# Patient Record
Sex: FEMALE | Race: OTHER | HISPANIC OR LATINO
[De-identification: names, ages, dates, MRNs, and addresses within clinical notes are randomized per-mention and may not be internally consistent; named-entity substitution may affect disease eponyms.]

---

## 2024-05-29 PROBLEM — Z00.00 ENCOUNTER FOR PREVENTIVE HEALTH EXAMINATION: Status: ACTIVE | Noted: 2024-05-29

## 2024-06-02 ENCOUNTER — NON-APPOINTMENT (OUTPATIENT)
Age: 79
End: 2024-06-02

## 2024-06-03 ENCOUNTER — APPOINTMENT (OUTPATIENT)
Dept: ENDOCRINOLOGY | Facility: CLINIC | Age: 79
End: 2024-06-03
Payer: MEDICARE

## 2024-06-03 VITALS
DIASTOLIC BLOOD PRESSURE: 66 MMHG | WEIGHT: 114 LBS | BODY MASS INDEX: 26.38 KG/M2 | OXYGEN SATURATION: 96 % | HEIGHT: 55 IN | SYSTOLIC BLOOD PRESSURE: 120 MMHG | HEART RATE: 69 BPM

## 2024-06-03 PROCEDURE — 99204 OFFICE O/P NEW MOD 45 MIN: CPT

## 2024-06-03 PROCEDURE — G2211 COMPLEX E/M VISIT ADD ON: CPT

## 2024-06-03 RX ORDER — ALENDRONATE SODIUM 70 MG/1
70 TABLET ORAL
Qty: 2 | Refills: 3 | Status: ACTIVE | COMMUNITY
Start: 2024-06-03 | End: 1900-01-01

## 2024-06-03 RX ORDER — METOPROLOL TARTRATE 75 MG/1
TABLET, FILM COATED ORAL
Refills: 0 | Status: ACTIVE | COMMUNITY

## 2024-06-03 RX ORDER — DAPAGLIFLOZIN 10 MG/1
10 TABLET, FILM COATED ORAL
Refills: 0 | Status: ACTIVE | COMMUNITY

## 2024-06-03 RX ORDER — LISINOPRIL 10 MG/1
10 TABLET ORAL
Refills: 0 | Status: ACTIVE | COMMUNITY

## 2024-06-03 RX ORDER — ASPIRIN 81 MG
81 TABLET, DELAYED RELEASE (ENTERIC COATED) ORAL
Refills: 0 | Status: ACTIVE | COMMUNITY

## 2024-06-03 RX ORDER — ROSUVASTATIN CALCIUM 40 MG/1
40 TABLET, FILM COATED ORAL
Refills: 0 | Status: ACTIVE | COMMUNITY

## 2024-06-03 NOTE — HISTORY OF PRESENT ILLNESS
[FreeTextEntry1] : Osteoporosis diagnosed by DEXA scan Previous osteoporosis treatments: Alendronate x 2 years Last DEXA scan: 6 years ago History of fractures: Denies Height loss: Possibly Calcium and Vitamin D through diet and supplementation: Calcium 400 mg daily Physically active: Walks Problems with balance or vision: Denies History of falls: Denies History of hyperparathyroidism: Denies History of hypercalcemia: Denies History of kidney stones: Denies History of malabsorption or eating disorder: Prior long-duration of glucocorticoid use: Denies Prior anti-epileptic medications: Denies Prior chemotherapy or radiation therapy: Denies Caffeine, smoking, and alcohol history: Denies Prolonged amenorrhea or early menopause: Denies Family history of fractures, osteoporosis, or hyperparathyroidism: Sister, osteoporosis.  Heartburn or reflux symptoms: Denies ASCVD History: "Mini heart attacks" 7 years ago Dentist following: Top and bottom dentures.  6/3/2024- INITIAL VISIT Patient and her daughter report that she was diagnosed with osteoporosis per DEXA scan a few years back and started on weekly alendronate approximately 2 years ago.  The patient reportedly went back to her home country recently and when she returned, she had run out of her medication.  Both are now interested in annual Reclast infusions to simplify therapy. I recommend obtaining a current DEXA scan to ensure bisphosphonate therapy is sufficient to treat her degree of osteoporosis.  Additionally I would like x-rays of the thoracolumbar spine to assess for vertebral compression fractures which would necessitate treatment with anabolic therapy.  Routine osteoporosis labs are needed to evaluate calcium, kidney, and vitamin D levels, as well as to evaluate for secondary causes of osteoporosis, such as primary hyperparathyroidism. Discussed supportive management by ingesting 8972-5134 mg elemental calcium and 2000 units vitamin D daily through diet and supplementation. Encouraged weight-bearing exercises and avoidance of prolonged bedrest. Discussed possible adverse effects of osteoporosis therapy, including hypocalcemia, injection site reaction, musculoskeletal aches, osteonecrosis of the jaw and atypical femoral fractures. These complications do not occur often, rarely if at all. We will need routine labs drawn regularly just to ensure there is no hypocalcemia or vitamin D deficiency.  Results of tests will be followed up with plan over the phone.  She will continue weekly Fosamax unless severe osteoporosis is detected. Patient will follow-up in clinic every 6 months with routine osteoporosis labs completed prior to the visits. All questions and concerns were fully addressed to patient's satisfaction. Patient is in agreement with stated plan.

## 2024-06-03 NOTE — REVIEW OF SYSTEMS
[Fatigue] : no fatigue [Decreased Appetite] : appetite not decreased [Recent Weight Loss (___ Lbs)] : no recent weight loss [Visual Field Defect] : no visual field defect [Dysphagia] : no dysphagia [Neck Pain] : no neck pain [Dysphonia] : no dysphonia [Polyuria] : no polyuria [Polydipsia] : no polydipsia [Negative] : Gastrointestinal [FreeTextEntry9] : No hip/groin pain.

## 2024-06-03 NOTE — REASON FOR VISIT
[Initial Evaluation] : an initial evaluation [Osteoporosis] : osteoporosis [Family Member] : family member [Ad Hoc ] : provided by an ad hoc  [Source: ______] : History obtained from SUE [Interpreters_FullName] : Maria Esther [Interpreters_Relationshiptopatient] : Daughter [TWNoteComboBox1] : [place]

## 2024-06-03 NOTE — PHYSICAL EXAM
[Alert] : alert [Healthy Appearance] : healthy appearance [No Acute Distress] : no acute distress [Well Developed] : well developed [Normal Voice/Communication] : normal voice communication [Normal Sclera/Conjunctiva] : normal sclera/conjunctiva [EOMI] : extra ocular movement intact [No Proptosis] : no proptosis [Normal Hearing] : hearing was normal [No Neck Mass] : no neck mass was observed [No Respiratory Distress] : no respiratory distress [Normal Rate and Effort] : normal respiratory rate and effort [Normal Rate] : heart rate was normal [Regular Rhythm] : with a regular rhythm [No Spinal Tenderness] : no spinal tenderness [Spine Straight] : spine straight [Kyphosis] : no kyphosis present [No Stigmata of Cushings Syndrome] : no stigmata of Cushings Syndrome [Normal Gait] : normal gait [Normal Strength/Tone] : muscle strength and tone were normal [No Rash] : no rash [No Motor Deficits] : the motor exam was normal [No Tremors] : no tremors [Oriented x3] : oriented to person, place, and time [Normal Affect] : the affect was normal [Recent Memory Normal] : recent memory was not impaired [Normal Insight/Judgement] : insight and judgment were intact [Normal Mood] : the mood was normal [Remote Memory Normal] : remote memory was not impaired

## 2024-06-06 LAB
24R-OH-CALCIDIOL SERPL-MCNC: 51.4 PG/ML
25(OH)D3 SERPL-MCNC: 26.8 NG/ML
ALBUMIN SERPL ELPH-MCNC: 4.5 G/DL
ALP BLD-CCNC: 93 U/L
ALT SERPL-CCNC: 24 U/L
ANION GAP SERPL CALC-SCNC: 13 MMOL/L
AST SERPL-CCNC: 20 U/L
BASOPHILS # BLD AUTO: 0.01 K/UL
BASOPHILS NFR BLD AUTO: 0.2 %
BILIRUB SERPL-MCNC: 1 MG/DL
BUN SERPL-MCNC: 20 MG/DL
CALCIUM SERPL-MCNC: 9.2 MG/DL
CALCIUM SERPL-MCNC: 9.2 MG/DL
CHLORIDE SERPL-SCNC: 107 MMOL/L
CO2 SERPL-SCNC: 22 MMOL/L
CREAT SERPL-MCNC: 0.87 MG/DL
EGFR: 68 ML/MIN/1.73M2
EOSINOPHIL # BLD AUTO: 0.03 K/UL
EOSINOPHIL NFR BLD AUTO: 0.6 %
GLUCOSE SERPL-MCNC: 109 MG/DL
HCT VFR BLD CALC: 36.3 %
HGB BLD-MCNC: 11.7 G/DL
IMM GRANULOCYTES NFR BLD AUTO: 0.2 %
LYMPHOCYTES # BLD AUTO: 2.49 K/UL
LYMPHOCYTES NFR BLD AUTO: 45.9 %
MAGNESIUM SERPL-MCNC: 2 MG/DL
MAN DIFF?: NORMAL
MCHC RBC-ENTMCNC: 30.2 PG
MCHC RBC-ENTMCNC: 32.2 GM/DL
MCV RBC AUTO: 93.6 FL
MONOCYTES # BLD AUTO: 0.33 K/UL
MONOCYTES NFR BLD AUTO: 6.1 %
NEUTROPHILS # BLD AUTO: 2.56 K/UL
NEUTROPHILS NFR BLD AUTO: 47 %
PARATHYROID HORMONE INTACT: 21 PG/ML
PHOSPHATE SERPL-MCNC: 3.5 MG/DL
PLATELET # BLD AUTO: 223 K/UL
POTASSIUM SERPL-SCNC: 4.3 MMOL/L
PROT SERPL-MCNC: 7.4 G/DL
RBC # BLD: 3.88 M/UL
RBC # FLD: 14 %
SODIUM SERPL-SCNC: 142 MMOL/L
WBC # FLD AUTO: 5.43 K/UL

## 2024-06-17 DIAGNOSIS — M81.0 AGE-RELATED OSTEOPOROSIS W/OUT CURRENT PATHOLOGICAL FRACTURE: ICD-10-CM

## 2025-04-01 ENCOUNTER — APPOINTMENT (OUTPATIENT)
Dept: ENDOCRINOLOGY | Facility: CLINIC | Age: 80
End: 2025-04-01
Payer: MEDICARE

## 2025-04-01 VITALS
DIASTOLIC BLOOD PRESSURE: 72 MMHG | BODY MASS INDEX: 27.31 KG/M2 | HEART RATE: 67 BPM | SYSTOLIC BLOOD PRESSURE: 122 MMHG | WEIGHT: 118 LBS | HEIGHT: 55 IN | OXYGEN SATURATION: 97 %

## 2025-04-01 DIAGNOSIS — Z91.148 PATIENT'S OTHER NONCOMPLIANCE WITH MEDICATION REGIMEN FOR OTHER REASON: ICD-10-CM

## 2025-04-01 DIAGNOSIS — M81.0 AGE-RELATED OSTEOPOROSIS W/OUT CURRENT PATHOLOGICAL FRACTURE: ICD-10-CM

## 2025-04-01 DIAGNOSIS — N18.2 CHRONIC KIDNEY DISEASE, STAGE 2 (MILD): ICD-10-CM

## 2025-04-01 PROCEDURE — G2211 COMPLEX E/M VISIT ADD ON: CPT

## 2025-04-01 PROCEDURE — 99214 OFFICE O/P EST MOD 30 MIN: CPT

## 2025-04-02 PROBLEM — N18.2 STAGE 2 CHRONIC KIDNEY DISEASE: Status: ACTIVE | Noted: 2025-04-02

## 2025-04-10 ENCOUNTER — APPOINTMENT (OUTPATIENT)
Dept: ENDOCRINOLOGY | Facility: CLINIC | Age: 80
End: 2025-04-10